# Patient Record
Sex: MALE | Race: WHITE | NOT HISPANIC OR LATINO | Employment: FULL TIME | URBAN - METROPOLITAN AREA
[De-identification: names, ages, dates, MRNs, and addresses within clinical notes are randomized per-mention and may not be internally consistent; named-entity substitution may affect disease eponyms.]

---

## 2021-11-17 ENCOUNTER — OFFICE VISIT (OUTPATIENT)
Dept: FAMILY MEDICINE CLINIC | Facility: CLINIC | Age: 21
End: 2021-11-17

## 2021-11-17 VITALS
TEMPERATURE: 98.7 F | WEIGHT: 134.6 LBS | DIASTOLIC BLOOD PRESSURE: 68 MMHG | SYSTOLIC BLOOD PRESSURE: 118 MMHG | OXYGEN SATURATION: 99 % | HEART RATE: 90 BPM | HEIGHT: 72 IN | BODY MASS INDEX: 18.23 KG/M2

## 2021-11-17 DIAGNOSIS — G44.209 ACUTE NON INTRACTABLE TENSION-TYPE HEADACHE: ICD-10-CM

## 2021-11-17 DIAGNOSIS — G44.209 TENSION HEADACHE: ICD-10-CM

## 2021-11-17 DIAGNOSIS — F41.1 GENERALIZED ANXIETY DISORDER: ICD-10-CM

## 2021-11-17 DIAGNOSIS — J30.2 SEASONAL ALLERGIES: ICD-10-CM

## 2021-11-17 DIAGNOSIS — Z00.00 ADULT GENERAL MEDICAL EXAM: Primary | ICD-10-CM

## 2021-11-17 PROCEDURE — 99203 OFFICE O/P NEW LOW 30 MIN: CPT | Performed by: NURSE PRACTITIONER

## 2021-11-17 RX ORDER — IBUPROFEN 200 MG
200 TABLET ORAL EVERY 6 HOURS PRN
COMMUNITY

## 2021-11-17 RX ORDER — HYDROXYZINE PAMOATE 50 MG/1
50 CAPSULE ORAL 3 TIMES DAILY PRN
Qty: 90 CAPSULE | Refills: 1 | Status: SHIPPED | OUTPATIENT
Start: 2021-11-17 | End: 2022-02-15

## 2021-11-17 RX ORDER — CETIRIZINE HYDROCHLORIDE 10 MG/1
10 TABLET ORAL DAILY
Qty: 90 TABLET | Refills: 2 | Status: SHIPPED | OUTPATIENT
Start: 2021-11-17

## 2021-11-17 NOTE — PROGRESS NOTES
"Chief Complaint  Headache, new pcp, and Sinus Problem    Subjective          Robert Subramanian presents to [facility]  History of Present Illness   20-year-old healthy male resenting to clinic to establish care with complaints of intermittent headache and nasal congestion.  He has tried over-the-counter ibuprofen and Tylenol Cold and flu with some relief.  Reports generalized anxiety for several years, no prior treatment.  Request postponing his annual physical and blood work for later date.  Patient's BMI within the underweight range, education on increased protein and caloric intake provided.  He denies any other issues or concerns today    Objective   Vital Signs:   /68   Pulse 90   Temp 98.7 °F (37.1 °C)   Ht 182.9 cm (72\")   Wt 61.1 kg (134 lb 9.6 oz)   SpO2 99%   BMI 18.26 kg/m²     Physical Exam  Vitals and nursing note reviewed.   Constitutional:       Appearance: Normal appearance.   HENT:      Head: Normocephalic.      Right Ear: Tympanic membrane normal.      Left Ear: Tympanic membrane normal.      Nose: Nose normal.      Mouth/Throat:      Mouth: Mucous membranes are dry.      Pharynx: Oropharynx is clear.   Eyes:      Extraocular Movements: Extraocular movements intact.      Conjunctiva/sclera: Conjunctivae normal.      Pupils: Pupils are equal, round, and reactive to light.   Cardiovascular:      Rate and Rhythm: Normal rate and regular rhythm.      Pulses: Normal pulses.      Heart sounds: Normal heart sounds.   Pulmonary:      Effort: Pulmonary effort is normal.      Breath sounds: Normal breath sounds.   Abdominal:      General: Bowel sounds are normal.      Palpations: Abdomen is soft.   Musculoskeletal:         General: Normal range of motion.      Cervical back: Normal range of motion.   Skin:     General: Skin is warm and dry.      Capillary Refill: Capillary refill takes less than 2 seconds.   Neurological:      General: No focal deficit present.      " Mental Status: He is alert and oriented to person, place, and time.   Psychiatric:         Mood and Affect: Mood normal.         Behavior: Behavior normal.         Thought Content: Thought content normal.         Judgment: Judgment normal.        Result Review :                 Assessment and Plan    Diagnoses and all orders for this visit:    1. Adult general medical exam (Primary)    2. Seasonal allergies    3. Acute non intractable tension-type headache    4. Generalized anxiety disorder    5. Tension headache    Other orders  -     cetirizine (zyrTEC) 10 MG tablet; Take 1 tablet by mouth Daily.  Dispense: 90 tablet; Refill: 2  -     hydrOXYzine pamoate (Vistaril) 50 MG capsule; Take 1 capsule by mouth 3 (Three) Times a Day As Needed for Itching.  Dispense: 90 capsule; Refill: 1        Follow Up   Return in about 1 month (around 12/17/2021).  Patient was given instructions and counseling regarding his condition or for health maintenance advice. Please see specific information pulled into the AVS if appropriate.

## 2021-11-17 NOTE — PATIENT INSTRUCTIONS
High-Protein and High-Calorie Diet  Eating high-protein and high-calorie foods can help you to gain weight, heal after an injury, and recover after an illness or surgery. The specific amount of daily protein and calories you need depends on:  · Your body weight.  · The reason this diet is recommended for you.  What is my plan?  Generally, a high-protein, high-calorie diet involves:  · Eating 250-500 extra calories each day.  · Making sure that you get enough of your daily calories from protein. Ask your health care provider how many of your calories should come from protein.  Talk with a health care provider, such as a diet and nutrition specialist (dietitian), about how much protein and how many calories you need each day. Follow the diet as directed by your health care provider.  What are tips for following this plan?    Preparing meals  · Add whole milk, half-and-half, or heavy cream to cereal, pudding, soup, or hot cocoa.  · Add whole milk to instant breakfast drinks.  · Add peanut butter to oatmeal or smoothies.  · Add powdered milk to baked goods, smoothies, or milkshakes.  · Add powdered milk, cream, or butter to mashed potatoes.  · Add cheese to cooked vegetables.  · Make whole-milk yogurt parfaits. Top them with granola, fruit, or nuts.  · Add cottage cheese to your fruit.  · Add avocado, cheese, or both to sandwiches or salads.  · Add meat, poultry, or seafood to rice, pasta, casseroles, salads, and soups.  · Use mayonnaise when making egg salad, chicken salad, or tuna salad.  · Use peanut butter as a dip for vegetables or as a topping for pretzels, celery, or crackers.  · Add beans to casseroles, dips, and spreads.  · Add pureed beans to sauces and soups.  · Replace calorie-free drinks with calorie-containing drinks, such as milk and fruit juice.  · Replace water with milk or heavy cream when making foods such as oatmeal, pudding, or cocoa.  General instructions  · Ask your health care provider if you  should take a nutritional supplement.  · Try to eat six small meals each day instead of three large meals.  · Eat a balanced diet. In each meal, include one food that is high in protein.  · Keep nutritious snacks available, such as nuts, trail mixes, dried fruit, and yogurt.  · If you have kidney disease or diabetes, talk with your health care provider about how much protein is safe for you. Too much protein may put extra stress on your kidneys.  · Drink your calories. Choose high-calorie drinks and have them after your meals.  What high-protein foods should I eat?    Vegetables  Soybeans. Peas.  Grains  Quinoa. Bulgur wheat.  Meats and other proteins  Beef, pork, and poultry. Fish and seafood. Eggs. Tofu. Textured vegetable protein (TVP). Peanut butter. Nuts and seeds. Dried beans. Protein powders.  Dairy  Whole milk. Whole-milk yogurt. Powdered milk. Cheese. Cottage Cheese. Eggnog.  Beverages  High-protein supplement drinks. Soy milk.  Other foods  Protein bars.  The items listed above may not be a complete list of high-protein foods and beverages. Contact a dietitian for more options.  What high-calorie foods should I eat?  Fruits  Dried fruit. Fruit leather. Canned fruit in syrup. Fruit juice. Avocado.  Vegetables  Vegetables cooked in oil or butter. Fried potatoes.  Grains  Pasta. Quick breads. Muffins. Pancakes. Ready-to-eat cereal.  Meats and other proteins  Peanut butter. Nuts and seeds.  Dairy  Heavy cream. Whipped cream. Cream cheese. Sour cream. Ice cream. Custard. Pudding.  Beverages  Meal-replacement beverages. Nutrition shakes. Fruit juice. Sugar-sweetened soft drinks.  Seasonings and condiments  Salad dressing. Mayonnaise. Madhu sauce. Fruit preserves or jelly. Honey. Syrup.  Sweets and desserts  Cake. Cookies. Pie. Pastries. Candy bars. Chocolate.  Fats and oils  Butter or margarine. Oil. Gravy.  Other foods  Meal-replacement bars.  The items listed above may not be a complete list of high-calorie  foods and beverages. Contact a dietitian for more options.  Summary  · A high-protein, high-calorie diet can help you gain weight or heal faster after an injury, illness, or surgery.  · To increase your protein and calories, add ingredients such as whole milk, peanut butter, cheese, beans, meat, or seafood to meal items.  · To get enough extra calories each day, include high-calorie foods and beverages at each meal.  · Adding a high-calorie drink or shake can be an easy way to help you get enough calories each day. Talk with your healthcare provider or dietitian about the best options for you.  This information is not intended to replace advice given to you by your health care provider. Make sure you discuss any questions you have with your health care provider.  Document Revised: 11/30/2018 Document Reviewed: 10/30/2018  [employer] Patient Education © 2021 [employer]

## 2022-02-15 RX ORDER — HYDROXYZINE PAMOATE 50 MG/1
50 CAPSULE ORAL 3 TIMES DAILY PRN
Qty: 90 CAPSULE | Refills: 0 | Status: SHIPPED | OUTPATIENT
Start: 2022-02-15